# Patient Record
Sex: FEMALE | Race: WHITE | HISPANIC OR LATINO | Employment: FULL TIME | URBAN - METROPOLITAN AREA
[De-identification: names, ages, dates, MRNs, and addresses within clinical notes are randomized per-mention and may not be internally consistent; named-entity substitution may affect disease eponyms.]

---
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
## 2019-04-19 ENCOUNTER — OFFICE VISIT (OUTPATIENT)
Dept: OBSTETRICS AND GYNECOLOGY | Age: 19
End: 2019-04-19

## 2019-04-19 VITALS
BODY MASS INDEX: 42.46 KG/M2 | SYSTOLIC BLOOD PRESSURE: 110 MMHG | WEIGHT: 264.2 LBS | HEIGHT: 66 IN | DIASTOLIC BLOOD PRESSURE: 66 MMHG

## 2019-04-19 DIAGNOSIS — Z97.5 BREAKTHROUGH BLEEDING ON NEXPLANON: Primary | ICD-10-CM

## 2019-04-19 DIAGNOSIS — N92.1 BREAKTHROUGH BLEEDING ON NEXPLANON: Primary | ICD-10-CM

## 2019-04-19 PROCEDURE — 99213 OFFICE O/P EST LOW 20 MIN: CPT | Performed by: NURSE PRACTITIONER

## 2019-04-19 RX ORDER — IBUPROFEN 200 MG
200 TABLET ORAL EVERY 6 HOURS PRN
COMMUNITY
End: 2020-03-14

## 2019-04-19 RX ORDER — AMOXICILLIN 500 MG/1
CAPSULE ORAL
Refills: 0 | COMMUNITY
Start: 2019-01-19 | End: 2020-03-14

## 2019-04-19 NOTE — PROGRESS NOTES
[facility]  Routine Annual Visit    2019    Patient: Dinesh Bermudez          MR#:[id number]      History of Present Illness    18 y.o. female  who presents with complaint of abnormal bleeding on nexplanon. This is her second implant- reinserted 2018. She reports mostly regular light cycles on the nexplanon since December but reports she had some prolonged bleeding for the past 2-3 weeks, now lightening. She reports new partner since last visit so STI testing will be collected. She denies exposure to STI, pelvic pain, fever. This is her first occurrence of abnormal bleeding so wanted some reassurance.     Patient's last menstrual period was 2019 (approximate).  Obstetric History:  OB History      Para Term  AB Living    0 0 0 0 0 0    SAB TAB Ectopic Molar Multiple Live Births    0 0 0   0           Menstrual History:     Patient's last menstrual period was 2019 (approximate).       Sexual History:       ________________________________________  There is no problem list on file for this patient.      Past Medical History:   Diagnosis Date   • Depression    • Hypertension        Past Surgical History:   Procedure Laterality Date   • WISDOM TOOTH EXTRACTION         Social History     Tobacco Use   Smoking Status Never Smoker   Smokeless Tobacco Never Used       Family History   Problem Relation Age of Onset   • No Known Problems Mother        Prior to Admission medications    Medication Sig Start Date End Date Taking? Authorizing Provider   ibuprofen (ADVIL,MOTRIN) 200 MG tablet Take 200 mg by mouth Every 6 (Six) Hours As Needed for Mild Pain .   Yes Norma Stanley MD   amoxicillin (AMOXIL) 500 MG capsule TK 1 CAPSULE PO Q 12 H 19   ProviderNorma MD     ________________________________________    The following portions of the patient's history were reviewed and updated as appropriate: allergies, current medications, past family history, past  "medical history, past social history, past surgical history and problem list.    Review of Systems   Constitutional: Negative for chills, fatigue and fever.   Gastrointestinal: Negative for abdominal distention and abdominal pain.   Genitourinary: Positive for menstrual problem. Negative for decreased urine volume, difficulty urinating, dyspareunia, dysuria, enuresis, flank pain, frequency, genital sores, hematuria, pelvic pain, urgency, vaginal bleeding, vaginal discharge and vaginal pain.   Psychiatric/Behavioral: Negative.        Objective   Physical Exam   Constitutional: She is oriented to person, place, and time. She appears well-developed and well-nourished. No distress.   Neurological: She is alert and oriented to person, place, and time.   Skin: Skin is warm and dry.       /66   Ht 167.6 cm (66\")   Wt 120 kg (264 lb 3.2 oz)   LMP 04/02/2019 (Approximate)   Breastfeeding? No   BMI 42.64 kg/m²    BP Readings from Last 3 Encounters:   04/19/19 110/66   12/11/18 126/80      Wt Readings from Last 3 Encounters:   04/19/19 120 kg (264 lb 3.2 oz) (>99 %, Z= 2.61)*   12/11/18 120 kg (265 lb) (>99 %, Z= 2.59)*     * Growth percentiles are based on CDC (Girls, 2-20 Years) data.        BMI: Estimated body mass index is 42.64 kg/m² as calculated from the following:    Height as of this encounter: 167.6 cm (66\").    Weight as of this encounter: 120 kg (264 lb 3.2 oz).          Assessment:    1. Breakthrough bleeding on nexplanon- STI testing collected; reassurance provided. Offered trial of estrogen therapy- Dinesh declines as bleeding seems to be resolving. She will call for persistent abnormal bleeding.     Plan:    []  Rx:   []  Mammogram request made  []  PAP done  []  Occult fecal blood test ([facility])  []  Labs:   []  GC/Chl/TV  []  DEXA scan   []  Referral for colonoscopy:           CHASITY Terrazas  4/19/2019 11:25 AM  "

## 2019-04-23 ENCOUNTER — TELEPHONE (OUTPATIENT)
Dept: OBSTETRICS AND GYNECOLOGY | Age: 19
End: 2019-04-23

## 2019-04-23 NOTE — TELEPHONE ENCOUNTER
----- Message from CHASITY Julian sent at 4/23/2019  8:50 AM EDT -----  Please inform patient her STI testing returned negative.

## 2021-04-16 ENCOUNTER — BULK ORDERING (OUTPATIENT)
Dept: CASE MANAGEMENT | Facility: OTHER | Age: 21
End: 2021-04-16

## 2021-04-16 DIAGNOSIS — Z23 IMMUNIZATION DUE: ICD-10-CM

## 2022-01-20 ENCOUNTER — OFFICE VISIT (OUTPATIENT)
Dept: OBSTETRICS AND GYNECOLOGY | Age: 22
End: 2022-01-20

## 2022-01-20 VITALS
BODY MASS INDEX: 44.58 KG/M2 | DIASTOLIC BLOOD PRESSURE: 76 MMHG | WEIGHT: 267.6 LBS | SYSTOLIC BLOOD PRESSURE: 124 MMHG | HEIGHT: 65 IN

## 2022-01-20 DIAGNOSIS — Z01.419 WELL WOMAN EXAM WITH ROUTINE GYNECOLOGICAL EXAM: Primary | ICD-10-CM

## 2022-01-20 PROCEDURE — 99395 PREV VISIT EST AGE 18-39: CPT | Performed by: NURSE PRACTITIONER

## 2022-01-20 NOTE — PROGRESS NOTES
[facility]  Routine Annual Visit    2022    Patient: Dinesh Bermudez          MR#:[id number]      History of Present Illness    21 y.o. female  who presents for annual exam and to discuss getting new nexplanon    No complaints and no new medical history  Has been happy with the nexplanon and desires a new device    Patient's last menstrual period was 2022 (exact date).  Obstetric History:  OB History        0    Para   0    Term   0       0    AB   0    Living   0       SAB   0    IAB   0    Ectopic   0    Molar        Multiple   0    Live Births                   Menstrual History:     Patient's last menstrual period was 2022 (exact date).       Sexual History:       ________________________________________  Patient Active Problem List   Diagnosis   (none) - all problems resolved or deleted       Past Medical History:   Diagnosis Date   • Depression    • Hypertension        Past Surgical History:   Procedure Laterality Date   • WISDOM TOOTH EXTRACTION         Social History     Tobacco Use   Smoking Status Never Smoker   Smokeless Tobacco Never Used       Family History   Problem Relation Age of Onset   • No Known Problems Mother        Prior to Admission medications    Medication Sig Start Date End Date Taking? Authorizing Provider   Etonogestrel (NEXPLANON) 68 MG implant subdermal implant Inject 1 each into the appropriate area of the skin as directed by provider 1 (One) Time. Inserted 18   Yes Provider, MD Norma     ________________________________________  The following portions of the patient's history were reviewed and updated as appropriate: allergies, current medications, past family history, past medical history, past social history, past surgical history and problem list.    Review of Systems   Constitutional: Negative.    HENT: Negative.    Eyes: Negative for visual disturbance.   Respiratory: Negative for cough, shortness of breath and  "wheezing.    Cardiovascular: Negative for chest pain, palpitations and leg swelling.   Gastrointestinal: Negative for abdominal distention, abdominal pain, blood in stool, constipation, diarrhea, nausea and vomiting.   Endocrine: Negative for cold intolerance and heat intolerance.   Genitourinary: Negative for difficulty urinating, dyspareunia, dysuria, frequency, genital sores, hematuria, menstrual problem, pelvic pain, urgency, vaginal bleeding, vaginal discharge and vaginal pain.   Musculoskeletal: Negative.    Skin: Negative.    Neurological: Negative for dizziness, weakness, light-headedness, numbness and headaches.   Hematological: Negative.    Psychiatric/Behavioral: Negative.    Breasts: negative for lumps skin changes, dimpling, swelling, nipple changes/discharge bilaterally         Objective   Physical Exam    /76   Ht 165.1 cm (65\")   Wt 121 kg (267 lb 9.6 oz)   LMP 01/04/2022 (Exact Date)   Breastfeeding No   BMI 44.53 kg/m²    BP Readings from Last 3 Encounters:   01/20/22 124/76   01/06/22 149/91   01/05/22 122/86      Wt Readings from Last 3 Encounters:   01/20/22 121 kg (267 lb 9.6 oz)   01/06/22 118 kg (260 lb)   01/05/22 123 kg (270 lb 9.6 oz)        BMI: Estimated body mass index is 44.53 kg/m² as calculated from the following:    Height as of this encounter: 165.1 cm (65\").    Weight as of this encounter: 121 kg (267 lb 9.6 oz).            General:   alert, appears stated age and cooperative   Heart: regular rate and rhythm, S1, S2 normal, no murmur, click, rub or gallop   Lungs: clear to auscultation bilaterally   Abdomen: soft, non-tender, without masses or organomegaly   Breast: inspection negative, no nipple discharge or bleeding, no masses or nodularity palpable   Vulva: External genitalia including bartholin's glands, Urethra, Zavalla's gland and urethra meatus are normal, Perineum, rectum and anus appear normal  and Bladder appears normal without significant prolapse    Vagina: " normal mucosa, normal discharge   Cervix: no cervical motion tenderness and no lesions   Uterus: normal size, mobile, non-tender and normal shape and consistency   Adnexa: no mass, fullness, tenderness     Assessment:    Diagnoses and all orders for this visit:    1. Well woman exam with routine gynecological exam (Primary)  -     IGP,CtNgTv,rfx Aptima HPV ASCU      Counseled on healthy lifestyle modifications, safe sex, condom use, and self breast exams.    Return for nexplanon reinsert and annually       Soila Sarabia, APRN  1/20/2022 10:57 EST

## 2022-01-22 LAB
C TRACH RRNA CVX QL NAA+PROBE: POSITIVE
CONV .: ABNORMAL
CYTOLOGIST CVX/VAG CYTO: ABNORMAL
CYTOLOGY CVX/VAG DOC CYTO: ABNORMAL
CYTOLOGY CVX/VAG DOC THIN PREP: ABNORMAL
DX ICD CODE: ABNORMAL
HIV 1 & 2 AB SER-IMP: ABNORMAL
N GONORRHOEA RRNA CVX QL NAA+PROBE: NEGATIVE
OTHER STN SPEC: ABNORMAL
STAT OF ADQ CVX/VAG CYTO-IMP: ABNORMAL
T VAGINALIS RRNA SPEC QL NAA+PROBE: NEGATIVE

## 2022-01-24 PROBLEM — A74.9 CHLAMYDIA: Status: ACTIVE | Noted: 2022-01-24

## 2022-01-24 RX ORDER — AZITHROMYCIN 500 MG/1
1000 TABLET, FILM COATED ORAL ONCE
Qty: 2 TABLET | Refills: 0 | Status: SHIPPED | OUTPATIENT
Start: 2022-01-24 | End: 2022-01-24

## 2022-01-24 NOTE — PROGRESS NOTES
Please inform patient her pap smear returned negative (normal) however chlamydia returned positive. Antibiotic sent to pharmacy. Partner will need to be treated before resuming intercourse. Help patient arrange a test of cure appointment in about 6 weeks. Thank you.

## 2022-02-15 ENCOUNTER — OFFICE VISIT (OUTPATIENT)
Dept: OBSTETRICS AND GYNECOLOGY | Age: 22
End: 2022-02-15

## 2022-02-15 VITALS
HEIGHT: 65 IN | DIASTOLIC BLOOD PRESSURE: 80 MMHG | WEIGHT: 267 LBS | SYSTOLIC BLOOD PRESSURE: 120 MMHG | BODY MASS INDEX: 44.48 KG/M2

## 2022-02-15 DIAGNOSIS — Z30.46 NEXPLANON REMOVAL: Primary | ICD-10-CM

## 2022-02-15 DIAGNOSIS — Z13.9 SPECIAL SCREENING: ICD-10-CM

## 2022-02-15 DIAGNOSIS — Z30.017 NEXPLANON INSERTION: ICD-10-CM

## 2022-02-15 LAB
B-HCG UR QL: NEGATIVE
EXPIRATION DATE: NORMAL
INTERNAL NEGATIVE CONTROL: NORMAL
INTERNAL POSITIVE CONTROL: NORMAL
Lab: NORMAL

## 2022-02-15 PROCEDURE — 11983 REMOVE/INSERT DRUG IMPLANT: CPT | Performed by: OBSTETRICS & GYNECOLOGY

## 2022-02-15 PROCEDURE — 81025 URINE PREGNANCY TEST: CPT | Performed by: OBSTETRICS & GYNECOLOGY

## 2022-02-15 NOTE — PROGRESS NOTES
Procedure   Remove & Insert Drug Implant    Date/Time: 2/15/2022 11:08 AM  Performed by: Noa Vallejo MD  Authorized by: Noa Vallejo MD   Preparation: Patient was prepped and draped in the usual sterile fashion.  Local anesthesia used: yes    Anesthesia:  Local anesthesia used: yes  Local Anesthetic: lidocaine 1% with epinephrine  Anesthetic total: 5 mL    Sedation:  Patient sedated: no    Patient tolerance: Patient tolerated the procedure well with no immediate complications  Comments: Nexplanon palpated in patient's arm -alcohol swab to previous incision site - Lidocaine injected.  Scalpel used for 5mm incision - hemostat used to remove device without difficulty.   Steri-strips placed and arm wrapped with ace bandage.       Steri-strips placed

## 2022-02-15 NOTE — PROGRESS NOTES
Procedure   Remove & Insert Drug Implant    Date/Time: 2/15/2022 11:09 AM  Performed by: Noa Vallejo MD  Authorized by: Nao Vallejo MD   Local anesthesia used: yes  Anesthesia: local infiltration    Anesthesia:  Local anesthesia used: yes  Local Anesthetic: lidocaine 1% with epinephrine  Anesthetic total: 5 mL    Sedation:  Patient sedated: no    Patient tolerance: Patient tolerated the procedure well with no immediate complications  Comments: Alcohol swab to left arm approximately 6cm from medial epicondyle - lidocaine injected - hibaclens x 2 - Nexplanon inserted without difficulty.  Band-aid placed and arm wrapped with ace bandage.

## 2022-02-23 ENCOUNTER — OFFICE VISIT (OUTPATIENT)
Dept: OBSTETRICS AND GYNECOLOGY | Age: 22
End: 2022-02-23

## 2022-02-23 ENCOUNTER — TELEPHONE (OUTPATIENT)
Dept: OBSTETRICS AND GYNECOLOGY | Age: 22
End: 2022-02-23

## 2022-02-23 VITALS
SYSTOLIC BLOOD PRESSURE: 116 MMHG | DIASTOLIC BLOOD PRESSURE: 84 MMHG | HEIGHT: 65 IN | WEIGHT: 266.2 LBS | BODY MASS INDEX: 44.35 KG/M2

## 2022-02-23 DIAGNOSIS — L08.9 SUPERFICIAL BACTERIAL SKIN INFECTION: Primary | ICD-10-CM

## 2022-02-23 DIAGNOSIS — B96.89 SUPERFICIAL BACTERIAL SKIN INFECTION: Primary | ICD-10-CM

## 2022-02-23 PROCEDURE — 99213 OFFICE O/P EST LOW 20 MIN: CPT | Performed by: NURSE PRACTITIONER

## 2022-02-23 RX ORDER — SULFAMETHOXAZOLE AND TRIMETHOPRIM 800; 160 MG/1; MG/1
1 TABLET ORAL 2 TIMES DAILY
Qty: 14 TABLET | Refills: 0 | Status: SHIPPED | OUTPATIENT
Start: 2022-02-23 | End: 2022-03-02

## 2022-02-23 NOTE — TELEPHONE ENCOUNTER
----- Message from Alicia Farah MA sent at 2/23/2022  8:35 AM EST -----  Regarding: FW: Question regarding REMOVE AND INSERT DRUG IMPLANT    ----- Message -----  From: Dinesh Bermudez  Sent: 2/22/2022  10:00 PM EST  [facility]  Subject: Question regarding REMOVE AND INSERT DRUG IM#    .

## 2022-02-23 NOTE — PROGRESS NOTES
Subjective   Dinesh Bermudez is a 21 y.o. female.     History of Present Illness     Dinesh presents with c/o redness, pain, and itching of nexplanon replacement site x 2 days  No numbness or tingling of extremity  Patient has been able to palpate the implant   She denies fevers/chills    The following portions of the patient's history were reviewed and updated as appropriate: allergies, current medications, past family history, past medical history, past social history, past surgical history and problem list.    Review of Systems     See HPI    Objective   Physical Exam    Patient appears well and is in no acute distres  Nexplanon palpated left upper arm  Area of erythema surrounds incision site  No drainage of site  Mild tenderness with palpation    Assessment/Plan   Diagnoses and all orders for this visit:    1. Superficial bacterial skin infection (Primary)  -     CBC (No Diff)    Other orders  -     sulfamethoxazole-trimethoprim (Bactrim DS) 800-160 MG per tablet; Take 1 tablet by mouth 2 (Two) Times a Day for 7 days.  Dispense: 14 tablet; Refill: 0      Discussed with Dr. Vallejo. Recommend bactrim BID x 1 week with close follow up.  Marked area of redness and patient is advised to call if redness extends beyond that line  To call with with fevers/chills  Advised to call office tomorrow for CBC results  Follow up Tuesday    CHASITY Terrazas

## 2022-02-24 LAB
ERYTHROCYTE [DISTWIDTH] IN BLOOD BY AUTOMATED COUNT: 12.4 % (ref 11.7–15.4)
HCT VFR BLD AUTO: 40.2 % (ref 34–46.6)
HGB BLD-MCNC: 13.5 G/DL (ref 11.1–15.9)
MCH RBC QN AUTO: 30.4 PG (ref 26.6–33)
MCHC RBC AUTO-ENTMCNC: 33.6 G/DL (ref 31.5–35.7)
MCV RBC AUTO: 91 FL (ref 79–97)
PLATELET # BLD AUTO: 324 X10E3/UL (ref 150–450)
RBC # BLD AUTO: 4.44 X10E6/UL (ref 3.77–5.28)
WBC # BLD AUTO: 7.1 X10E3/UL (ref 3.4–10.8)

## 2022-02-24 NOTE — TELEPHONE ENCOUNTER
Patient started abx yesterday - denies any pain or fever  , swelling went down the only thing that she is having is itching .

## 2022-03-01 ENCOUNTER — OFFICE VISIT (OUTPATIENT)
Dept: OBSTETRICS AND GYNECOLOGY | Age: 22
End: 2022-03-01

## 2022-03-01 VITALS
BODY MASS INDEX: 44.32 KG/M2 | WEIGHT: 266 LBS | HEIGHT: 65 IN | DIASTOLIC BLOOD PRESSURE: 76 MMHG | SYSTOLIC BLOOD PRESSURE: 120 MMHG

## 2022-03-01 DIAGNOSIS — A74.9 CHLAMYDIA: ICD-10-CM

## 2022-03-01 DIAGNOSIS — L08.9 SUPERFICIAL SKIN INFECTION: Primary | ICD-10-CM

## 2022-03-01 DIAGNOSIS — Z97.5 NEXPLANON IN PLACE: ICD-10-CM

## 2022-03-01 PROCEDURE — 99213 OFFICE O/P EST LOW 20 MIN: CPT | Performed by: OBSTETRICS & GYNECOLOGY

## 2022-03-01 RX ORDER — ETONOGESTREL 68 MG/1
1 IMPLANT SUBCUTANEOUS ONCE
COMMUNITY

## 2022-03-01 NOTE — PROGRESS NOTES
"Roney Bermudez is a 21 y.o. female presents for f/u on infection of left upper extremity after nexplanon insertion on 2/23/22.  Completing Bactrim today. Taking benadryl for itching , denies any more problems sometimes having light itching - dry skin .  Denies any pain or discomfort in left arm and reports she can palpate the nexplanon easily.      History of Present Illness    The following portions of the patient's history were reviewed and updated as appropriate: allergies, current medications, past family history, past medical history, past social history, past surgical history and problem list.    Review of Systems   Constitutional: Negative for chills and fever.   Gastrointestinal: Negative for abdominal distention and abdominal pain.   Genitourinary: Negative for dysuria, menstrual problem, pelvic pain, vaginal bleeding, vaginal discharge and vaginal pain.   Skin:        + left upper extremity itching    All other systems reviewed and are negative.  /76   Ht 165.1 cm (65\")   Wt 121 kg (266 lb)   LMP 02/08/2022 (Exact Date)   Breastfeeding No   BMI 44.26 kg/m²       Objective   Physical Exam  Vitals and nursing note reviewed.   Constitutional:       Appearance: Normal appearance. She is obese.   Musculoskeletal:      Comments: LUE with no erythema, edema or tenderness    Neurological:      Mental Status: She is alert.         Assessment/Plan   Diagnoses and all orders for this visit:    1. Superficial skin infection (Primary)    2. Chlamydia  -     Chlamydia trachomatis, Neisseria gonorrhoeae, Trichomonas vaginalis, PCR - Urine, Urine, Clean Catch    3. Nexplanon in place       Counseling was given to patient for the following topics: instructions for management, impressions, importance of treatment compliance and to call me if any additional issues/problems with Nexplanon  .   Counseling was given to patient for the following topics: instructions for management, impressions and " importance of treatment compliance . Total time of the encounter was 20 minutes and 15 minutes was spent counseling.

## 2022-03-03 LAB
C TRACH RRNA SPEC QL NAA+PROBE: NEGATIVE
N GONORRHOEA RRNA SPEC QL NAA+PROBE: NEGATIVE
T VAGINALIS RRNA SPEC QL NAA+PROBE: NEGATIVE

## 2023-03-07 ENCOUNTER — OFFICE VISIT (OUTPATIENT)
Dept: OBSTETRICS AND GYNECOLOGY | Age: 23
End: 2023-03-07
Payer: COMMERCIAL

## 2023-03-07 VITALS
WEIGHT: 247 LBS | BODY MASS INDEX: 41.15 KG/M2 | HEIGHT: 65 IN | DIASTOLIC BLOOD PRESSURE: 78 MMHG | SYSTOLIC BLOOD PRESSURE: 114 MMHG

## 2023-03-07 DIAGNOSIS — Z11.3 SCREEN FOR STD (SEXUALLY TRANSMITTED DISEASE): ICD-10-CM

## 2023-03-07 DIAGNOSIS — Z30.46 ENCOUNTER FOR SURVEILLANCE OF IMPLANTABLE SUBDERMAL CONTRACEPTIVE: ICD-10-CM

## 2023-03-07 DIAGNOSIS — Z01.419 ENCOUNTER FOR GYNECOLOGICAL EXAMINATION WITHOUT ABNORMAL FINDING: Primary | ICD-10-CM

## 2023-03-07 DIAGNOSIS — Z97.5 NEXPLANON IN PLACE: ICD-10-CM

## 2023-03-07 DIAGNOSIS — Z12.4 SCREENING FOR MALIGNANT NEOPLASM OF CERVIX: ICD-10-CM

## 2023-03-07 PROBLEM — A74.9 CHLAMYDIA: Status: RESOLVED | Noted: 2022-01-24 | Resolved: 2023-03-07

## 2023-03-07 PROCEDURE — 99395 PREV VISIT EST AGE 18-39: CPT | Performed by: OBSTETRICS & GYNECOLOGY

## 2023-03-07 NOTE — PROGRESS NOTES
"Roney Bermudez is a 22 y.o. female AE-last pap 1/20/22(-) chlamydia (+) no complaints today.  Works as manager at [employer]. Reports regular periods with Nexplanon.         History of Present Illness    The following portions of the patient's history were reviewed and updated as appropriate: allergies, current medications, past family history, past medical history, past social history, past surgical history and problem list.    Review of Systems   Constitutional: Negative for chills, fatigue and fever.   Gastrointestinal: Negative for abdominal distention and abdominal pain.   Genitourinary: Negative for dysuria, menstrual problem, pelvic pain, vaginal bleeding, vaginal discharge and vaginal pain.   All other systems reviewed and are negative.    /78   Ht 165.1 cm (65\")   Wt 112 kg (247 lb)   LMP 02/14/2023   BMI 41.10 kg/m²     Objective   Physical Exam  Vitals and nursing note reviewed.   Constitutional:       Appearance: Normal appearance. She is well-developed and normal weight.   Neck:      Thyroid: No thyromegaly.   Cardiovascular:      Rate and Rhythm: Normal rate and regular rhythm.   Pulmonary:      Effort: Pulmonary effort is normal.      Breath sounds: Normal breath sounds.   Chest:   Breasts:     Right: No mass, nipple discharge, skin change or tenderness.      Left: No mass, nipple discharge, skin change or tenderness.   Abdominal:      General: Bowel sounds are normal. There is no distension.      Palpations: Abdomen is soft.      Tenderness: There is no abdominal tenderness.   Genitourinary:     Exam position: Supine.      Vagina: Normal. No vaginal discharge.      Cervix: No friability.      Uterus: Not tender.       Adnexa:         Right: No mass or tenderness.          Left: No mass or tenderness.     Musculoskeletal:         General: Normal range of motion.      Cervical back: Normal range of motion and neck supple.   Skin:     General: Skin is warm and dry.      " Findings: No rash.   Neurological:      Mental Status: She is alert and oriented to person, place, and time.   Psychiatric:         Mood and Affect: Mood normal.         Behavior: Behavior normal.           Assessment & Plan   Diagnoses and all orders for this visit:    1. Screen for STD (sexually transmitted disease) (Primary)  -     NuSwab VG+ - Swab, Vagina    2. Screening for malignant neoplasm of cervix  -     IGP, Rfx Aptima HPV ASCU    3. Encounter for gynecological examination without abnormal finding    4. Encounter for surveillance of implantable subdermal contraceptive    5. Nexplanon in place      Counseling was given to patient for the following topics: instructions for management, importance of treatment compliance and self-breast exams .  Return in about 1 year (around 3/7/2024) for Annual physical.

## 2023-03-13 LAB
CONV .: NORMAL
CYTOLOGIST CVX/VAG CYTO: NORMAL
CYTOLOGY CVX/VAG DOC CYTO: NORMAL
CYTOLOGY CVX/VAG DOC THIN PREP: NORMAL
DX ICD CODE: NORMAL
HIV 1 & 2 AB SER-IMP: NORMAL
OTHER STN SPEC: NORMAL
STAT OF ADQ CVX/VAG CYTO-IMP: NORMAL

## 2023-03-14 LAB
A VAGINAE DNA VAG QL NAA+PROBE: NORMAL SCORE
BVAB2 DNA VAG QL NAA+PROBE: NORMAL SCORE
C ALBICANS DNA VAG QL NAA+PROBE: NEGATIVE
C GLABRATA DNA VAG QL NAA+PROBE: NEGATIVE
C TRACH DNA VAG QL NAA+PROBE: NEGATIVE
MEGA1 DNA VAG QL NAA+PROBE: NORMAL SCORE
N GONORRHOEA DNA VAG QL NAA+PROBE: NEGATIVE
T VAGINALIS DNA VAG QL NAA+PROBE: NEGATIVE

## 2025-01-21 ENCOUNTER — OFFICE VISIT (OUTPATIENT)
Dept: OBSTETRICS AND GYNECOLOGY | Age: 25
End: 2025-01-21
Payer: COMMERCIAL

## 2025-01-21 VITALS
WEIGHT: 250 LBS | SYSTOLIC BLOOD PRESSURE: 135 MMHG | HEIGHT: 65 IN | BODY MASS INDEX: 41.65 KG/M2 | DIASTOLIC BLOOD PRESSURE: 80 MMHG

## 2025-01-21 DIAGNOSIS — F32.9 REACTIVE DEPRESSION: ICD-10-CM

## 2025-01-21 DIAGNOSIS — Z11.51 SPECIAL SCREENING EXAMINATION FOR HUMAN PAPILLOMAVIRUS (HPV): ICD-10-CM

## 2025-01-21 DIAGNOSIS — Z01.419 ENCOUNTER FOR GYNECOLOGICAL EXAMINATION WITHOUT ABNORMAL FINDING: Primary | ICD-10-CM

## 2025-01-21 DIAGNOSIS — Z30.09 ENCOUNTER FOR OTHER GENERAL COUNSELING OR ADVICE ON CONTRACEPTION: ICD-10-CM

## 2025-01-21 DIAGNOSIS — Z11.3 SCREEN FOR STD (SEXUALLY TRANSMITTED DISEASE): ICD-10-CM

## 2025-01-21 DIAGNOSIS — Z12.4 SCREENING FOR MALIGNANT NEOPLASM OF THE CERVIX: ICD-10-CM
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
## 2025-01-23 DIAGNOSIS — N76.0 BV (BACTERIAL VAGINOSIS): Primary | ICD-10-CM

## 2025-01-23 DIAGNOSIS — B96.89 BV (BACTERIAL VAGINOSIS): Primary | ICD-10-CM

## 2025-01-23 LAB
A VAGINAE DNA VAG QL NAA+PROBE: ABNORMAL SCORE
BVAB2 DNA VAG QL NAA+PROBE: ABNORMAL SCORE
C ALBICANS DNA VAG QL NAA+PROBE: NEGATIVE
C GLABRATA DNA VAG QL NAA+PROBE: NEGATIVE
C TRACH DNA SPEC QL NAA+PROBE: NEGATIVE
CONV .: NORMAL
CYTOLOGIST CVX/VAG CYTO: NORMAL
CYTOLOGY CVX/VAG DOC CYTO: NORMAL
CYTOLOGY CVX/VAG DOC THIN PREP: NORMAL
DX ICD CODE: NORMAL
Lab: NORMAL
MEGA1 DNA VAG QL NAA+PROBE: ABNORMAL SCORE
N GONORRHOEA DNA VAG QL NAA+PROBE: NEGATIVE
OTHER STN SPEC: NORMAL
STAT OF ADQ CVX/VAG CYTO-IMP: NORMAL
T VAGINALIS DNA VAG QL NAA+PROBE: NEGATIVE

## 2025-01-23 NOTE — PROGRESS NOTES
Call patient and notify of  BV infection - Nuvessa sent to pharmacy - please let us know if too expensive or not covered and she can  sample or we can give her sample when comes back for nexplanon

## 2025-01-30 ENCOUNTER — TELEPHONE (OUTPATIENT)
Dept: OBSTETRICS AND GYNECOLOGY | Age: 25
End: 2025-01-30

## 2025-01-30 NOTE — TELEPHONE ENCOUNTER
Provider: DR. SHAWN BARLOW    [facility] received a fax that requires your attention. The document has been indexed to the patient's chart for your review.     Reason for sending: REVIEW     Documents Description: PRIOR AUTHORIZATION NOT NEEDED     Name of Sender: [facility]     Date Indexed: 01/30/25    Notes (if needed): INDEXED INTO CHART AS EXT MED RECORDS 01/29/25 DATE RECEIVED.  [facility] ALSO SENT BACK ALL DOCS THAT WAS FAXED TO THEM. INDEXED ALL TOGETHER.

## 2025-02-11 ENCOUNTER — OFFICE VISIT (OUTPATIENT)
Dept: OBSTETRICS AND GYNECOLOGY | Age: 25
End: 2025-02-11
Payer: COMMERCIAL

## 2025-02-11 VITALS
BODY MASS INDEX: 41.82 KG/M2 | HEIGHT: 65 IN | SYSTOLIC BLOOD PRESSURE: 124 MMHG | WEIGHT: 251 LBS | DIASTOLIC BLOOD PRESSURE: 78 MMHG

## 2025-02-11 DIAGNOSIS — Z13.9 SPECIAL SCREENING: ICD-10-CM

## 2025-02-11 DIAGNOSIS — Z97.5 NEXPLANON IN PLACE: ICD-10-CM

## 2025-02-11 DIAGNOSIS — Z30.46 ENCOUNTER FOR REMOVAL AND REINSERTION OF NEXPLANON: Primary | ICD-10-CM

## 2025-02-11 DIAGNOSIS — Z30.017 INSERTION OF NEXPLANON: ICD-10-CM

## 2025-02-11 LAB
B-HCG UR QL: NEGATIVE
EXPIRATION DATE: NORMAL
INTERNAL NEGATIVE CONTROL: NEGATIVE
INTERNAL POSITIVE CONTROL: NORMAL
Lab: NORMAL

## 2025-02-11 NOTE — PROGRESS NOTES
Procedure   Remove Drug Implant Device    Date/Time: 2/11/2025 10:50 AM    Performed by: Noa Vallejo MD  Authorized by: Noa Vallejo MD  Preparation: Patient was prepped and draped in the usual sterile fashion.  Local anesthesia used: yes    Anesthesia:  Local anesthesia used: yes  Local Anesthetic: lidocaine 1% with epinephrine  Anesthetic total: 5 mL    Sedation:  Patient sedated: no    Patient tolerance: Patient tolerated the procedure well with no immediate complications  Comments: Nexplanon palpated in patient's arm -alcohol swab to previous incision site - Lidocaine injected.  Scalpel used for 5mm incision - hemostat used to remove device without difficulty.   Steri-strips placed and arm wrapped with ace bandage.       Steri-strips placed

## 2025-02-11 NOTE — PROGRESS NOTES
Procedure   Insert Drug Implant    Date/Time: 2/11/2025 10:51 AM    Performed by: Noa Vallejo MD  Authorized by: Noa Vallejo MD  Local anesthesia used: yes  Anesthesia: local infiltration    Anesthesia:  Local anesthesia used: yes  Local Anesthetic: lidocaine 1% with epinephrine  Anesthetic total: 5 mL    Sedation:  Patient sedated: no    Patient tolerance: Patient tolerated the procedure well with no immediate complications  Comments: Alcohol swab to left arm approximately 6cm from medial epicondyle - lidocaine injected - hibaclens x 2 - Nexplanon inserted without difficulty.  Band-aid placed and arm wrapped with ace bandage.        ccc walter